# Patient Record
(demographics unavailable — no encounter records)

---

## 2025-01-08 NOTE — IMAGING
[de-identified] : LEFT ELBOW skin intact. mild swelling of elbow. TTP to lateral elbow. elbow ROM: deferred. wrist ROM: limited extension, flexion. very limited pronosupination. no pain with wrist flexion/extension. + pain with pronosupination. good digital extension, flex to full fist. Sensation intact to light touch. palpable radial pulse.  I independently reviewed and interpreted outside XRAYS OF LEFT ELBOW @ 1/7/25 (3 views - AP, OBLIQUE, AND LATERAL VIEWS): distal humerus lateral epicondyle fracture with mild displacement. evaluation limited due to no lateral view. XRAYS OF LEFT ELBOW @TODAY (3 views - AP, OBLIQUE, AND LATERAL VIEWS): distal humerus lateral epicondyle fracture with mild displacement.

## 2025-01-08 NOTE — HISTORY OF PRESENT ILLNESS
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 Went to [facility] => XR.  Hx: none. [FreeTextEntry5] : IRINEO haskins [RHD] 25 year old female is here today for evaluation of LEFT elbow pain since this morning (01/07/25) after falling and landing on elbow. went to [facility] +xrays. denies prior injury to elbow.

## 2025-01-08 NOTE — HISTORY OF PRESENT ILLNESS
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 Went to [facility] => XR.  Hx: none. [FreeTextEntry5] : IRINEO haskins [RHD] 25 year old female is here today for evaluation of LEFT elbow pain since this morning (01/07/25) after falling and landing on elbow. went to [facility] +xrays. denies prior injury to elbow.

## 2025-01-08 NOTE — ASSESSMENT
[FreeTextEntry1] : The condition was explained to the patient. - CT of L elbow to evaluate fracture morphology. - I personally applied an orthoglass (pre-padded fiberglass) long arm splint in the interim with the wrist in neutral position. ok to remove splint temporarily for CT. Reviewed splint care instructions - do not remove splint, do not get splint wet, do not put objects down splint. We discussed that there is a moderate risk of complications and morbidity with splinting, including skin burn, skin irritation, skin breakdown, and stiffness from immobilization. They expressed understanding. - elevate elbow above level of heart as much as possible to reduce swelling. - provided sling PRN. - NWB to [facility]. - Work: recommend OOW.  F/u after CT.

## 2025-01-08 NOTE — IMAGING
[de-identified] : LEFT ELBOW skin intact. mild swelling of elbow. TTP to lateral elbow. elbow ROM: deferred. wrist ROM: limited extension, flexion. very limited pronosupination. no pain with wrist flexion/extension. + pain with pronosupination. good digital extension, flex to full fist. Sensation intact to light touch. palpable radial pulse.  I independently reviewed and interpreted outside XRAYS OF LEFT ELBOW @ 1/7/25 (3 views - AP, OBLIQUE, AND LATERAL VIEWS): distal humerus lateral epicondyle fracture with mild displacement. evaluation limited due to no lateral view. XRAYS OF LEFT ELBOW @TODAY (3 views - AP, OBLIQUE, AND LATERAL VIEWS): distal humerus lateral epicondyle fracture with mild displacement.

## 2025-01-21 NOTE — IMAGING
[de-identified] : LEFT ELBOW skin intact. mild swelling of elbow. TTP to lateral elbow. elbow ROM: deferred. wrist ROM: limited extension, flexion. very limited pronosupination. no pain with wrist flexion/extension. + pain with pronosupination. good digital extension, flex to full fist. Sensation intact to light touch. palpable radial pulse.  I independently reviewed and interpreted outside XRAYS OF LEFT ELBOW @ 1/7/25 (3 views - AP, OBLIQUE, AND LATERAL VIEWS): distal humerus lateral epicondyle fracture with mild displacement. evaluation limited due to no lateral view. XRAYS OF LEFT ELBOW @TODAY (3 views - AP, OBLIQUE, AND LATERAL VIEWS): distal humerus lateral epicondyle fracture with mild displacement.

## 2025-01-21 NOTE — HISTORY OF PRESENT ILLNESS
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 Went to [facility] => XR.  Hx: none. [FreeTextEntry5] : IRINEO is here today for LEFT elbow CT results. pt states since last visit, pain decreased. taking Meloxicam PRN pain.

## 2025-01-22 NOTE — HISTORY OF PRESENT ILLNESS
[de-identified] : 1/21/25: f/u CT of L elbow. f/u LEFT distal humerus fx. in long arm splint and sling. Pain better. + Meloxicam.  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 Went to [facility] => XR.  Hx: none. [FreeTextEntry5] : IRINEO is here today for LEFT elbow CT results. pt states since last visit, pain decreased. taking Meloxicam PRN pain.

## 2025-01-22 NOTE — ASSESSMENT
[FreeTextEntry1] : CT of L elbow reviewed with patient. The condition was explained to the patient. - maintain orthoglass (pre-padded fiberglass) long arm splint full time. - sling PRN. elevate elbow above level of heart as much as possible to reduce swelling. - demonstrated HEP (opening and closing a tight fist) to reduce digital stiffness, at least 3x/day. - NWB to [facility]. - Work: continue OOW.  Recommend f/u with Ortho Trauma ASAP, as this is outside my scope of practice, referred to Dr Cortez Rg. Patient expressed understanding.

## 2025-01-22 NOTE — IMAGING
[de-identified] : LEFT ELBOW long arm splint and sling intact. good digital extension, flex to loose fist. Sensation intact to light touch. brisk capillary refill all digits.  @ 1/7/25 XRAYS OF LEFT ELBOW @ 1/7/25 (3 views - AP, OBLIQUE, AND LATERAL VIEWS): distal humerus lateral epicondyle fracture with mild displacement. evaluation limited due to no lateral view. @1/7/25 XRAYS OF LEFT ELBOW (3 views - AP, OBLIQUE, AND LATERAL VIEWS): distal humerus lateral epicondyle fracture with mild displacement.